# Patient Record
Sex: MALE | Race: WHITE | Employment: OTHER | URBAN - METROPOLITAN AREA
[De-identification: names, ages, dates, MRNs, and addresses within clinical notes are randomized per-mention and may not be internally consistent; named-entity substitution may affect disease eponyms.]

---

## 2024-02-20 ENCOUNTER — HOSPITAL ENCOUNTER (EMERGENCY)
Age: 74
End: 2024-02-20
Attending: EMERGENCY MEDICINE
Payer: MEDICARE

## 2024-02-20 DIAGNOSIS — I46.9 CARDIAC ARREST (HCC): Primary | ICD-10-CM

## 2024-02-20 LAB
GLUCOSE BLD-MCNC: 151 MG/DL (ref 70–99)
PERFORMED ON: ABNORMAL

## 2024-02-20 PROCEDURE — 31500 INSERT EMERGENCY AIRWAY: CPT

## 2024-02-20 PROCEDURE — 6360000002 HC RX W HCPCS: Performed by: EMERGENCY MEDICINE

## 2024-02-20 PROCEDURE — 2500000003 HC RX 250 WO HCPCS: Performed by: EMERGENCY MEDICINE

## 2024-02-20 PROCEDURE — 99285 EMERGENCY DEPT VISIT HI MDM: CPT

## 2024-02-20 PROCEDURE — 92950 HEART/LUNG RESUSCITATION CPR: CPT

## 2024-02-20 RX ORDER — CALCIUM CHLORIDE 100 MG/ML
INJECTION INTRAVENOUS; INTRAVENTRICULAR DAILY PRN
Status: COMPLETED | OUTPATIENT
Start: 2024-02-20 | End: 2024-02-20

## 2024-02-20 RX ORDER — EPINEPHRINE IN SOD CHLOR,ISO 1 MG/10 ML
SYRINGE (ML) INTRAVENOUS DAILY PRN
Status: COMPLETED | OUTPATIENT
Start: 2024-02-20 | End: 2024-02-20

## 2024-02-20 RX ADMIN — CALCIUM CHLORIDE 1000 MG: 100 INJECTION, SOLUTION INTRAVENOUS at 07:31

## 2024-02-20 RX ADMIN — EPINEPHRINE 1 MG: 0.1 INJECTION, SOLUTION ENDOTRACHEAL; INTRACARDIAC; INTRAVENOUS at 07:28

## 2024-02-20 RX ADMIN — EPINEPHRINE 1 MG: 0.1 INJECTION, SOLUTION ENDOTRACHEAL; INTRACARDIAC; INTRAVENOUS at 07:33

## 2024-02-20 RX ADMIN — EPINEPHRINE 1 MG: 0.1 INJECTION, SOLUTION ENDOTRACHEAL; INTRACARDIAC; INTRAVENOUS at 07:30

## 2024-02-20 RX ADMIN — SODIUM BICARBONATE 50 MEQ: 84 INJECTION, SOLUTION INTRAVENOUS at 07:28

## 2024-02-20 RX ADMIN — SODIUM BICARBONATE 50 MEQ: 84 INJECTION, SOLUTION INTRAVENOUS at 07:33

## 2024-02-20 RX ADMIN — CALCIUM CHLORIDE 1000 MG: 100 INJECTION, SOLUTION INTRAVENOUS at 07:33

## 2024-02-20 NOTE — PROGRESS NOTES
Addendum:    Provided family with list of local  homes in the [facility].     24 0840   Encounter Summary   Encounter Overview/Reason  Initial Encounter;Crisis;Grief, Loss, and Adjustments;Family Care;Spiritual/Emotional Needs   Service Provided For: Family   Referral/Consult From: Nurse   Support System Spouse;Children   Last Encounter  24   Complexity of Encounter High   Begin Time 0815   End Time  0845   Total Time Calculated 30 min   Crisis   Type Code Blue;Family Care;Emotional distress   Rituals, Rites and Sacraments   Type Blessings   Grief, Loss, and Adjustments   Type Death;Life Adjustments;Grief and loss   Assessment/Intervention/Outcome   Assessment Coping;Impaired resilience;Sad;Tearful;Shock   Intervention Active listening;Discussed belief system/Church practices/sony;Prayer (assurance of)/[place];Sustaining Presence/Ministry of presence;Grief Care   Outcome Comfort;Engaged in conversation;Grieving;Receptive;Coping   Plan and Referrals   Plan/Referrals No future visits requested     This morning, I had the honor of meeting with patient and family at his bedside in the [facility]. He had already passed away from a witnessed cardiac arrest. Present were his wife, his daughter, and his son-in-law. Advised the family of the spiritual resources available to them, including a list of local  homes. They requested a copy of this list. They endorsed being Roman Catholic, but not practicing. I will prepare bereavement packet to be sent out.

## 2024-02-20 NOTE — CODE DOCUMENTATION
Report from EMS:   Family heard patient collapse; unresponsive and called 911.  CPR initiated by EMS staff at 0646. IGEL placed; 4x epi given en route.  Patient transported to [facility]- see code narrator for code documentation.

## 2024-02-20 NOTE — PROGRESS NOTES
Patient transported to [facility], family made aware and present, all lines and tubes removed, family to call Nursing home and took any patient belongings,

## 2024-02-20 NOTE — ED PROVIDER NOTES
[facility] Emergency Department      CHIEF COMPLAINT  Cardiac Arrest      HISTORY OF PRESENT ILLNESS  Daniel Parra is a 73 y.o. male with a history of no reported medical problems per EMS presents in cardiac arrest.  He apparently was a witnessed arrest at home.  His wife heard him collapse at home.  He was unresponsive.  On EMS arrival he was pulseless, in PEA.  CPR was initiated.  He was given 5 rounds of epi prior to arrival.  He was intubated with supraglottic i-gel.  According to EMS while they were doing compressions the patient was having some movement of his hands but then as soon as they would stop compressions he would again remain unresponsive.  I suspect this was from high-quality compressions after witnessed arrest..   No other complaints, modifying factors or associated symptoms.     History obtained from EMS    I have reviewed the following from the nursing documentation.    No past medical history on file.  No past surgical history on file.  No family history on file.  Social History     Socioeconomic History    Marital status:      Spouse name: Not on file    Number of children: Not on file    Years of education: Not on file    Highest education level: Not on file   Occupational History    Not on file   Tobacco Use    Smoking status: Not on file    Smokeless tobacco: Not on file   Substance and Sexual Activity    Alcohol use: Not on file    Drug use: Not on file    Sexual activity: Not on file   Other Topics Concern    Not on file   Social History Narrative    Not on file     Social Determinants of Health     Financial Resource Strain: Not on file   Food Insecurity: Not on file   Transportation Needs: Not on file   Physical Activity: Not on file   Stress: Not on file   Social Connections: Not on file   Intimate Partner Violence: Not on file   Housing Stability: Not on file     No current facility-administered medications for this encounter.     No current outpatient

## 2024-02-20 NOTE — CODE DOCUMENTATION
Igel in place upon arrival.  Igel removed and patient intubated by Dr. James.  8-0 tube size, 24 at the lip. Bilateral breath sounds, positive color change.